# Patient Record
Sex: MALE | Race: WHITE | NOT HISPANIC OR LATINO | URBAN - METROPOLITAN AREA
[De-identification: names, ages, dates, MRNs, and addresses within clinical notes are randomized per-mention and may not be internally consistent; named-entity substitution may affect disease eponyms.]

---

## 2018-09-28 ENCOUNTER — OFFICE (OUTPATIENT)
Dept: URBAN - METROPOLITAN AREA CLINIC 11 | Facility: CLINIC | Age: 68
End: 2018-09-28

## 2018-09-28 VITALS — WEIGHT: 198 LBS | DIASTOLIC BLOOD PRESSURE: 80 MMHG | SYSTOLIC BLOOD PRESSURE: 130 MMHG | HEIGHT: 73 IN

## 2018-09-28 DIAGNOSIS — R19.7 DIARRHEA, UNSPECIFIED: ICD-10-CM

## 2018-09-28 DIAGNOSIS — K55.9 VASCULAR DISORDER OF INTESTINE, UNSPECIFIED: ICD-10-CM

## 2018-09-28 PROCEDURE — 99203 OFFICE O/P NEW LOW 30 MIN: CPT | Performed by: INTERNAL MEDICINE

## 2018-09-28 NOTE — SERVICEHPINOTES
He presents for issues [facility] diarrhea stating earlier this summer after taking Clinda for a tooth related isue.  He stated that he has had the diarrhea intermittently but that it has resolved.  He stated that the diarrhea at the time would be explosive and urgent with about three stools.  He did have stool studies done but did not know the results. He had also thought that some of the diarrhea may have been related to [employer] fruit juice at work.  He has been having normal stools now for about a week.  .Last year he had blood in his stools and was seen by Dr. Medellin.  He had colonoscopy and was dx with ischemic colitis.He has had a hyperplastic polyp in 2003. His EPIC chart was reviewed with him including his bx, colonoscopy and CT from April 2017.

## 2018-09-28 NOTE — SERVICENOTES
We discussed his diarrhea and that this may have been an infectious issue, potentially C. diff, after the Clinda.  For now his sx have resovled and I would hold on further studies.  If it returns he is to call and we can do stool studies and possible an FSC with Bx.  We reviewed his colonoscopy reports/path reports from 2017 and discussed the dx and typical benign clinical course of acute ischemic colitis including risks of recurrent issues and possible stricturing which does not seem to be the case.